# Patient Record
Sex: FEMALE | Race: WHITE | NOT HISPANIC OR LATINO | Employment: UNEMPLOYED | URBAN - METROPOLITAN AREA
[De-identification: names, ages, dates, MRNs, and addresses within clinical notes are randomized per-mention and may not be internally consistent; named-entity substitution may affect disease eponyms.]

---

## 2023-04-21 DIAGNOSIS — T75.3XXA MOTION SICKNESS, INITIAL ENCOUNTER: ICD-10-CM

## 2023-04-21 RX ORDER — CIPROFLOXACIN 500 MG/1
500 TABLET ORAL 2 TIMES DAILY
Qty: 20 TABLET | Refills: 0 | Status: SHIPPED | OUTPATIENT
Start: 2023-04-21 | End: 2023-05-01

## 2023-06-20 LAB — THYROTROPIN (MIU/L) IN SER/PLAS BY DETECTION LIMIT <= 0.05 MIU/L: 0.27 MIU/L (ref 0.44–3.98)

## 2023-08-21 LAB — THYROTROPIN (MIU/L) IN SER/PLAS BY DETECTION LIMIT <= 0.05 MIU/L: 6.38 MIU/L (ref 0.44–3.98)

## 2023-08-24 LAB
THYROGLOBULIN AB (IU/ML) IN SER/PLAS: <0.9 IU/ML (ref 0–4)
THYROGLOBULIN LC-MS/MS: ABNORMAL NG/ML (ref 1.3–31.8)
THYROGLOBULIN: <0.1 NG/ML (ref 1.3–31.8)

## 2023-10-25 ENCOUNTER — APPOINTMENT (OUTPATIENT)
Dept: RADIOLOGY | Facility: CLINIC | Age: 58
End: 2023-10-25
Payer: COMMERCIAL

## 2023-10-30 PROBLEM — L30.1 DYSHIDROTIC ECZEMA: Status: ACTIVE | Noted: 2023-10-30

## 2023-10-30 PROBLEM — M41.9 SCOLIOSIS OF THORACOLUMBAR SPINE: Status: ACTIVE | Noted: 2023-10-30

## 2023-10-30 PROBLEM — N89.8 VAGINAL DRYNESS: Status: ACTIVE | Noted: 2023-10-30

## 2023-10-30 PROBLEM — M81.0 OSTEOPOROSIS: Status: ACTIVE | Noted: 2023-10-30

## 2023-10-30 PROBLEM — B35.4 TINEA CORPORIS: Status: ACTIVE | Noted: 2023-10-30

## 2023-10-30 PROBLEM — C73 PAPILLARY CARCINOMA OF THYROID (MULTI): Status: ACTIVE | Noted: 2023-10-30

## 2023-10-30 PROBLEM — K58.0 IRRITABLE BOWEL SYNDROME WITH DIARRHEA: Status: ACTIVE | Noted: 2023-10-30

## 2023-10-30 PROBLEM — M25.50 JOINT PAIN: Status: ACTIVE | Noted: 2023-10-30

## 2023-10-30 PROBLEM — F41.9 ANXIETY: Status: ACTIVE | Noted: 2023-10-30

## 2023-10-30 PROBLEM — R53.83 FATIGUE: Status: ACTIVE | Noted: 2023-10-30

## 2023-10-30 PROBLEM — H00.019 STYE: Status: ACTIVE | Noted: 2023-10-30

## 2023-10-30 PROBLEM — E83.51 HYPOCALCEMIA: Status: ACTIVE | Noted: 2023-10-30

## 2023-10-30 PROBLEM — D23.4 BENIGN NEOPLASM OF SKIN OF NECK: Status: ACTIVE | Noted: 2023-10-30

## 2023-10-30 PROBLEM — R00.2 PALPITATIONS: Status: ACTIVE | Noted: 2023-10-30

## 2023-10-30 PROBLEM — R29.898 NECK TIGHTNESS: Status: ACTIVE | Noted: 2023-10-30

## 2023-10-30 PROBLEM — R10.13 EPIGASTRIC PAIN: Status: ACTIVE | Noted: 2023-10-30

## 2023-10-30 PROBLEM — K30 FUNCTIONAL DYSPEPSIA: Status: ACTIVE | Noted: 2023-10-30

## 2023-10-30 PROBLEM — L25.9 CONTACT DERMATITIS: Status: ACTIVE | Noted: 2023-10-30

## 2023-10-30 PROBLEM — E55.9 VITAMIN D DEFICIENCY: Status: ACTIVE | Noted: 2023-10-30

## 2023-10-30 PROBLEM — K64.5 THROMBOSED EXTERNAL HEMORRHOID: Status: ACTIVE | Noted: 2023-10-30

## 2023-10-30 PROBLEM — R10.2 PELVIC PAIN: Status: ACTIVE | Noted: 2023-10-30

## 2023-10-30 PROBLEM — E03.9 HYPOTHYROIDISM: Status: ACTIVE | Noted: 2023-10-30

## 2023-10-30 PROBLEM — N64.4 MASTALGIA: Status: ACTIVE | Noted: 2023-10-30

## 2023-10-30 PROBLEM — G47.00 INSOMNIA: Status: ACTIVE | Noted: 2023-10-30

## 2023-10-30 PROBLEM — M62.838 TRAPEZIUS MUSCLE SPASM: Status: ACTIVE | Noted: 2023-10-30

## 2023-10-30 PROBLEM — R59.1 LYMPHADENOPATHY: Status: ACTIVE | Noted: 2023-10-30

## 2023-10-30 PROBLEM — T75.3XXA TRAVEL SICKNESS: Status: ACTIVE | Noted: 2023-10-30

## 2023-10-30 PROBLEM — G44.209 TENSION HEADACHE: Status: ACTIVE | Noted: 2023-10-30

## 2023-10-30 PROBLEM — L56.3 SOLAR URTICARIA: Status: ACTIVE | Noted: 2023-10-30

## 2023-10-30 PROBLEM — K21.9 GERD (GASTROESOPHAGEAL REFLUX DISEASE): Status: ACTIVE | Noted: 2023-10-30

## 2023-10-30 PROBLEM — M85.80 OSTEOPENIA: Status: ACTIVE | Noted: 2023-10-30

## 2023-10-30 PROBLEM — L30.9 ECZEMA: Status: ACTIVE | Noted: 2023-10-30

## 2023-10-30 PROBLEM — M54.50 LOW BACK PAIN: Status: ACTIVE | Noted: 2023-10-30

## 2023-10-30 PROBLEM — N89.8 VAGINAL ITCHING: Status: ACTIVE | Noted: 2023-10-30

## 2023-10-30 RX ORDER — LEVOTHYROXINE SODIUM 75 UG/1
75 TABLET ORAL EVERY OTHER DAY
COMMUNITY
Start: 2016-08-24 | End: 2023-10-31 | Stop reason: SDUPTHER

## 2023-10-30 RX ORDER — ALPRAZOLAM 0.25 MG/1
.5-1 TABLET ORAL DAILY PRN
COMMUNITY

## 2023-10-30 RX ORDER — LEVOTHYROXINE SODIUM 88 UG/1
88 TABLET ORAL
COMMUNITY
Start: 2016-08-24 | End: 2023-10-31 | Stop reason: SDUPTHER

## 2023-10-30 RX ORDER — NEOMYCIN/POLYMYXIN B/PRAMOXINE 3.5-10K-1
1 CREAM (GRAM) TOPICAL DAILY
COMMUNITY
Start: 2021-07-01 | End: 2023-10-31 | Stop reason: WASHOUT

## 2023-10-30 RX ORDER — LEVOTHYROXINE SODIUM 100 UG/1
100 TABLET ORAL EVERY OTHER DAY
COMMUNITY
End: 2023-10-31 | Stop reason: WASHOUT

## 2023-10-30 RX ORDER — IBUPROFEN 800 MG/1
1 TABLET ORAL 3 TIMES DAILY PRN
COMMUNITY
Start: 2022-04-20 | End: 2023-10-31 | Stop reason: WASHOUT

## 2023-10-30 RX ORDER — CYCLOBENZAPRINE HCL 5 MG
1 TABLET ORAL NIGHTLY PRN
COMMUNITY
Start: 2022-04-20

## 2023-10-30 RX ORDER — CHOLECALCIFEROL (VITAMIN D3) 25 MCG
TABLET,CHEWABLE ORAL
COMMUNITY
Start: 2022-11-15

## 2023-10-30 RX ORDER — IBANDRONATE SODIUM 150 MG/1
150 TABLET, FILM COATED ORAL
COMMUNITY
Start: 2023-01-23 | End: 2023-10-31 | Stop reason: WASHOUT

## 2023-10-30 RX ORDER — MULTIVIT-MIN/IRON/FOLIC ACID/K 18-600-40
CAPSULE ORAL
COMMUNITY
Start: 2023-08-29 | End: 2023-10-31 | Stop reason: WASHOUT

## 2023-10-31 ENCOUNTER — OFFICE VISIT (OUTPATIENT)
Dept: ENDOCRINOLOGY | Facility: CLINIC | Age: 58
End: 2023-10-31
Payer: COMMERCIAL

## 2023-10-31 VITALS
BODY MASS INDEX: 19.8 KG/M2 | WEIGHT: 138 LBS | DIASTOLIC BLOOD PRESSURE: 64 MMHG | SYSTOLIC BLOOD PRESSURE: 105 MMHG | HEART RATE: 82 BPM

## 2023-10-31 DIAGNOSIS — C73 PAPILLARY CARCINOMA OF THYROID (MULTI): Primary | ICD-10-CM

## 2023-10-31 PROCEDURE — 99213 OFFICE O/P EST LOW 20 MIN: CPT | Performed by: INTERNAL MEDICINE

## 2023-10-31 RX ORDER — LEVOTHYROXINE SODIUM 75 UG/1
75 TABLET ORAL EVERY OTHER DAY
Qty: 90 TABLET | Refills: 3 | Status: SHIPPED | OUTPATIENT
Start: 2023-10-31 | End: 2024-02-06 | Stop reason: SDUPTHER

## 2023-10-31 RX ORDER — LEVOTHYROXINE SODIUM 88 UG/1
88 TABLET ORAL EVERY OTHER DAY
Qty: 90 TABLET | Refills: 3 | Status: SHIPPED | OUTPATIENT
Start: 2023-10-31 | End: 2024-02-06 | Stop reason: SDUPTHER
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
## 2023-10-31 NOTE — PATIENT INSTRUCTIONS
RECOMMENDATIONS  Continue Synthroid alternate 88 vs 75 mcg every day.   Take levothyroxine on an empty stomach with water alone, 1 hour before eating or taking other medications, 4 hours before any calcium or iron supplement.    Follow up 3 months  Repeat TSH before next appointment

## 2023-11-14 ENCOUNTER — ANCILLARY PROCEDURE (OUTPATIENT)
Dept: RADIOLOGY | Facility: CLINIC | Age: 58
End: 2023-11-14
Payer: COMMERCIAL

## 2023-11-14 DIAGNOSIS — R10.11 RIGHT UPPER QUADRANT PAIN: ICD-10-CM

## 2023-11-14 PROCEDURE — 3430000001 HC RX 343 DIAGNOSTIC RADIOPHARMACEUTICALS: Performed by: FAMILY MEDICINE

## 2023-11-14 PROCEDURE — A9537 TC99M MEBROFENIN: HCPCS | Performed by: FAMILY MEDICINE

## 2023-11-14 PROCEDURE — 78227 HEPATOBIL SYST IMAGE W/DRUG: CPT | Performed by: NUCLEAR MEDICINE

## 2023-11-14 PROCEDURE — 78227 HEPATOBIL SYST IMAGE W/DRUG: CPT

## 2023-11-14 RX ORDER — KIT FOR THE PREPARATION OF TECHNETIUM TC 99M MEBROFENIN 45 MG/10ML
5.36 INJECTION, POWDER, LYOPHILIZED, FOR SOLUTION INTRAVENOUS
Status: COMPLETED | OUTPATIENT
Start: 2023-11-14 | End: 2023-11-14

## 2023-11-14 RX ADMIN — KIT FOR THE PREPARATION OF TECHNETIUM TC 99M MEBROFENIN 5.36 MILLICURIE: 45 INJECTION, POWDER, LYOPHILIZED, FOR SOLUTION INTRAVENOUS at 08:56

## 2023-11-30 ENCOUNTER — LAB (OUTPATIENT)
Dept: LAB | Facility: LAB | Age: 58
End: 2023-11-30
Payer: COMMERCIAL

## 2023-11-30 DIAGNOSIS — R77.2 ABNORMALITY OF ALPHAFETOPROTEIN: ICD-10-CM

## 2023-11-30 DIAGNOSIS — R93.2 ABNORMAL FINDINGS ON DIAGNOSTIC IMAGING OF LIVER AND BILIARY TRACT: ICD-10-CM

## 2023-11-30 DIAGNOSIS — R19.5 OTHER FECAL ABNORMALITIES: ICD-10-CM

## 2023-11-30 DIAGNOSIS — E83.52 HYPERCALCEMIA: Primary | ICD-10-CM

## 2023-11-30 LAB
25(OH)D3 SERPL-MCNC: 74 NG/ML (ref 30–100)
ALBUMIN SERPL BCP-MCNC: 5 G/DL (ref 3.4–5)
ALP SERPL-CCNC: 42 U/L (ref 33–110)
ALT SERPL W P-5'-P-CCNC: 12 U/L (ref 7–45)
AMYLASE SERPL-CCNC: 75 U/L (ref 29–103)
ANION GAP SERPL CALC-SCNC: 15 MMOL/L (ref 10–20)
AST SERPL W P-5'-P-CCNC: 16 U/L (ref 9–39)
BILIRUB SERPL-MCNC: 0.7 MG/DL (ref 0–1.2)
BUN SERPL-MCNC: 9 MG/DL (ref 6–23)
CA-I BLD-SCNC: 1.12 MMOL/L (ref 1.1–1.33)
CALCIUM SERPL-MCNC: 9.9 MG/DL (ref 8.6–10.6)
CERULOPLASMIN SERPL-MCNC: 27.5 MG/DL (ref 20–60)
CHLORIDE SERPL-SCNC: 104 MMOL/L (ref 98–107)
CO2 SERPL-SCNC: 28 MMOL/L (ref 21–32)
CREAT SERPL-MCNC: 0.78 MG/DL (ref 0.5–1.05)
CRP SERPL-MCNC: <0.1 MG/DL
GFR SERPL CREATININE-BSD FRML MDRD: 89 ML/MIN/1.73M*2
GLUCOSE SERPL-MCNC: 85 MG/DL (ref 74–99)
LIPASE SERPL-CCNC: 24 U/L (ref 9–82)
POTASSIUM SERPL-SCNC: 4.3 MMOL/L (ref 3.5–5.3)
PROT SERPL-MCNC: 6.7 G/DL (ref 6.4–8.2)
PTH-INTACT SERPL-MCNC: 34.4 PG/ML (ref 18.5–88)
SODIUM SERPL-SCNC: 143 MMOL/L (ref 136–145)

## 2023-11-30 PROCEDURE — 83690 ASSAY OF LIPASE: CPT

## 2023-11-30 PROCEDURE — 80053 COMPREHEN METABOLIC PANEL: CPT

## 2023-11-30 PROCEDURE — 82306 VITAMIN D 25 HYDROXY: CPT

## 2023-11-30 PROCEDURE — 36415 COLL VENOUS BLD VENIPUNCTURE: CPT

## 2023-11-30 PROCEDURE — 84630 ASSAY OF ZINC: CPT

## 2023-11-30 PROCEDURE — 83625 ASSAY OF LDH ENZYMES: CPT

## 2023-11-30 PROCEDURE — 82330 ASSAY OF CALCIUM: CPT

## 2023-11-30 PROCEDURE — 86038 ANTINUCLEAR ANTIBODIES: CPT

## 2023-11-30 PROCEDURE — 83970 ASSAY OF PARATHORMONE: CPT

## 2023-11-30 PROCEDURE — 82525 ASSAY OF COPPER: CPT

## 2023-11-30 PROCEDURE — 86140 C-REACTIVE PROTEIN: CPT

## 2023-11-30 PROCEDURE — 82150 ASSAY OF AMYLASE: CPT

## 2023-11-30 PROCEDURE — 82390 ASSAY OF CERULOPLASMIN: CPT

## 2023-12-02 LAB
COPPER SERPL-MCNC: 119.6 UG/DL (ref 80–155)
ZINC SERPL-MCNC: 105.2 UG/DL (ref 60–120)

## 2023-12-03 LAB
ANA SER QL HEP2 SUBST: NEGATIVE
LDH SERPL L TO P-CCNC: 160 U/L (ref 105–230)
LDH1 CFR SERPL ELPH: 29 % (ref 14–27)
LDH2 CFR SERPL ELPH: 35 % (ref 29–42)
LDH3 CFR SERPL ELPH: 22 % (ref 18–30)
LDH4 CFR SERPL ELPH: 8 % (ref 8–15)
LDH5 CFR SERPL ELPH: 6 % (ref 6–23)

## 2023-12-06 ENCOUNTER — OFFICE VISIT (OUTPATIENT)
Dept: SURGERY | Facility: CLINIC | Age: 58
End: 2023-12-06
Payer: COMMERCIAL

## 2023-12-06 VITALS
SYSTOLIC BLOOD PRESSURE: 116 MMHG | HEART RATE: 61 BPM | DIASTOLIC BLOOD PRESSURE: 77 MMHG | WEIGHT: 132 LBS | OXYGEN SATURATION: 99 % | HEIGHT: 70 IN | BODY MASS INDEX: 18.9 KG/M2

## 2023-12-06 DIAGNOSIS — Z01.818 PRE-OPERATIVE EXAM: Primary | ICD-10-CM

## 2023-12-06 PROCEDURE — 99205 OFFICE O/P NEW HI 60 MIN: CPT | Performed by: SURGERY
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
## 2023-12-07 DIAGNOSIS — K80.20 CALCULUS OF GALLBLADDER WITHOUT CHOLECYSTITIS WITHOUT OBSTRUCTION: ICD-10-CM

## 2023-12-07 DIAGNOSIS — R16.0 ENLARGEMENT OF LIVER: ICD-10-CM

## 2023-12-08 ENCOUNTER — HOSPITAL ENCOUNTER (OUTPATIENT)
Dept: CARDIOLOGY | Facility: CLINIC | Age: 58
Discharge: HOME | End: 2023-12-08
Payer: COMMERCIAL

## 2023-12-08 DIAGNOSIS — Z01.818 PRE-OPERATIVE EXAM: ICD-10-CM

## 2023-12-08 LAB
ATRIAL RATE: 57 BPM
P AXIS: 29 DEGREES
P OFFSET: 207 MS
P ONSET: 165 MS
PR INTERVAL: 112 MS
Q ONSET: 221 MS
QRS COUNT: 9 BEATS
QRS DURATION: 90 MS
QT INTERVAL: 416 MS
QTC CALCULATION(BAZETT): 404 MS
QTC FREDERICIA: 409 MS
R AXIS: 92 DEGREES
T AXIS: 79 DEGREES
T OFFSET: 429 MS
VENTRICULAR RATE: 57 BPM

## 2023-12-08 PROCEDURE — 93010 ELECTROCARDIOGRAM REPORT: CPT | Performed by: INTERNAL MEDICINE

## 2023-12-08 PROCEDURE — 93005 ELECTROCARDIOGRAM TRACING: CPT

## 2023-12-19 ENCOUNTER — ANCILLARY PROCEDURE (OUTPATIENT)
Dept: RADIOLOGY | Facility: CLINIC | Age: 58
End: 2023-12-19
Payer: COMMERCIAL

## 2023-12-19 DIAGNOSIS — Z13.6 ENCOUNTER FOR SCREENING FOR CARDIOVASCULAR DISORDERS: ICD-10-CM

## 2023-12-19 PROCEDURE — 75571 CT HRT W/O DYE W/CA TEST: CPT

## 2023-12-21 ENCOUNTER — ANESTHESIA EVENT (OUTPATIENT)
Dept: OPERATING ROOM | Facility: HOSPITAL | Age: 58
End: 2023-12-21
Payer: COMMERCIAL

## 2023-12-28 ENCOUNTER — APPOINTMENT (OUTPATIENT)
Dept: GASTROENTEROLOGY | Facility: CLINIC | Age: 58
End: 2023-12-28
Payer: COMMERCIAL

## 2023-12-28 ENCOUNTER — HOSPITAL ENCOUNTER (OUTPATIENT)
Facility: HOSPITAL | Age: 58
Setting detail: OUTPATIENT SURGERY
Discharge: HOME | End: 2023-12-28
Attending: SURGERY | Admitting: SURGERY
Payer: COMMERCIAL

## 2023-12-28 ENCOUNTER — ANESTHESIA (OUTPATIENT)
Dept: OPERATING ROOM | Facility: HOSPITAL | Age: 58
End: 2023-12-28
Payer: COMMERCIAL

## 2023-12-28 VITALS
HEIGHT: 70 IN | SYSTOLIC BLOOD PRESSURE: 128 MMHG | OXYGEN SATURATION: 100 % | BODY MASS INDEX: 18.94 KG/M2 | RESPIRATION RATE: 19 BRPM | HEART RATE: 60 BPM | TEMPERATURE: 97.9 F | WEIGHT: 132.28 LBS | DIASTOLIC BLOOD PRESSURE: 68 MMHG

## 2023-12-28 DIAGNOSIS — R16.0 ENLARGEMENT OF LIVER: ICD-10-CM

## 2023-12-28 DIAGNOSIS — K80.20 CALCULUS OF GALLBLADDER WITHOUT CHOLECYSTITIS WITHOUT OBSTRUCTION: Primary | ICD-10-CM

## 2023-12-28 PROBLEM — R11.2 PONV (POSTOPERATIVE NAUSEA AND VOMITING): Status: ACTIVE | Noted: 2023-12-28

## 2023-12-28 PROBLEM — Z98.890 PONV (POSTOPERATIVE NAUSEA AND VOMITING): Status: ACTIVE | Noted: 2023-12-28

## 2023-12-28 PROCEDURE — 3700000002 HC GENERAL ANESTHESIA TIME - EACH INCREMENTAL 1 MINUTE: Performed by: SURGERY

## 2023-12-28 PROCEDURE — 2500000004 HC RX 250 GENERAL PHARMACY W/ HCPCS (ALT 636 FOR OP/ED): Performed by: ANESTHESIOLOGY

## 2023-12-28 PROCEDURE — 2500000001 HC RX 250 WO HCPCS SELF ADMINISTERED DRUGS (ALT 637 FOR MEDICARE OP): Performed by: SURGERY

## 2023-12-28 PROCEDURE — 7100000010 HC PHASE TWO TIME - EACH INCREMENTAL 1 MINUTE: Performed by: SURGERY

## 2023-12-28 PROCEDURE — 49321 LAPAROSCOPY BIOPSY: CPT | Performed by: SURGERY

## 2023-12-28 PROCEDURE — 3700000001 HC GENERAL ANESTHESIA TIME - INITIAL BASE CHARGE: Performed by: SURGERY

## 2023-12-28 PROCEDURE — 88307 TISSUE EXAM BY PATHOLOGIST: CPT | Mod: TC,SUR,GEALAB | Performed by: SURGERY

## 2023-12-28 PROCEDURE — 2500000004 HC RX 250 GENERAL PHARMACY W/ HCPCS (ALT 636 FOR OP/ED): Performed by: NURSE ANESTHETIST, CERTIFIED REGISTERED

## 2023-12-28 PROCEDURE — 7100000009 HC PHASE TWO TIME - INITIAL BASE CHARGE: Performed by: SURGERY

## 2023-12-28 PROCEDURE — 7100000001 HC RECOVERY ROOM TIME - INITIAL BASE CHARGE: Performed by: SURGERY

## 2023-12-28 PROCEDURE — 3600000004 HC OR TIME - INITIAL BASE CHARGE - PROCEDURE LEVEL FOUR: Performed by: SURGERY

## 2023-12-28 PROCEDURE — 47562 LAPAROSCOPIC CHOLECYSTECTOMY: CPT | Performed by: SURGERY

## 2023-12-28 PROCEDURE — 7100000002 HC RECOVERY ROOM TIME - EACH INCREMENTAL 1 MINUTE: Performed by: SURGERY

## 2023-12-28 PROCEDURE — 96372 THER/PROPH/DIAG INJ SC/IM: CPT | Performed by: SURGERY

## 2023-12-28 PROCEDURE — 3600000009 HC OR TIME - EACH INCREMENTAL 1 MINUTE - PROCEDURE LEVEL FOUR: Performed by: SURGERY

## 2023-12-28 PROCEDURE — 2720000007 HC OR 272 NO HCPCS: Performed by: SURGERY

## 2023-12-28 PROCEDURE — 88307 TISSUE EXAM BY PATHOLOGIST: CPT

## 2023-12-28 PROCEDURE — A47562 PR LAP,CHOLECYSTECTOMY: Performed by: NURSE ANESTHETIST, CERTIFIED REGISTERED

## 2023-12-28 PROCEDURE — 88313 SPECIAL STAINS GROUP 2: CPT

## 2023-12-28 PROCEDURE — 2780000003 HC OR 278 NO HCPCS: Performed by: SURGERY

## 2023-12-28 PROCEDURE — 2500000005 HC RX 250 GENERAL PHARMACY W/O HCPCS: Performed by: SURGERY

## 2023-12-28 PROCEDURE — 2500000005 HC RX 250 GENERAL PHARMACY W/O HCPCS: Performed by: NURSE ANESTHETIST, CERTIFIED REGISTERED

## 2023-12-28 PROCEDURE — 88304 TISSUE EXAM BY PATHOLOGIST: CPT

## 2023-12-28 PROCEDURE — 2500000004 HC RX 250 GENERAL PHARMACY W/ HCPCS (ALT 636 FOR OP/ED): Performed by: SURGERY

## 2023-12-28 RX ORDER — ONDANSETRON HYDROCHLORIDE 2 MG/ML
INJECTION, SOLUTION INTRAVENOUS AS NEEDED
Status: DISCONTINUED | OUTPATIENT
Start: 2023-12-28 | End: 2023-12-28

## 2023-12-28 RX ORDER — ENOXAPARIN SODIUM 100 MG/ML
30 INJECTION SUBCUTANEOUS ONCE
Status: COMPLETED | OUTPATIENT
Start: 2023-12-28 | End: 2023-12-28

## 2023-12-28 RX ORDER — ACETAMINOPHEN 325 MG/1
650 TABLET ORAL ONCE
Status: COMPLETED | OUTPATIENT
Start: 2023-12-28 | End: 2023-12-28

## 2023-12-28 RX ORDER — DEXAMETHASONE SODIUM PHOSPHATE 4 MG/ML
INJECTION, SOLUTION INTRA-ARTICULAR; INTRALESIONAL; INTRAMUSCULAR; INTRAVENOUS; SOFT TISSUE AS NEEDED
Status: DISCONTINUED | OUTPATIENT
Start: 2023-12-28 | End: 2023-12-28

## 2023-12-28 RX ORDER — KETOROLAC TROMETHAMINE 30 MG/ML
INJECTION, SOLUTION INTRAMUSCULAR; INTRAVENOUS AS NEEDED
Status: DISCONTINUED | OUTPATIENT
Start: 2023-12-28 | End: 2023-12-28

## 2023-12-28 RX ORDER — CEFAZOLIN 1 G/1
INJECTION, POWDER, FOR SOLUTION INTRAVENOUS AS NEEDED
Status: DISCONTINUED | OUTPATIENT
Start: 2023-12-28 | End: 2023-12-28

## 2023-12-28 RX ORDER — SODIUM CHLORIDE, SODIUM LACTATE, POTASSIUM CHLORIDE, CALCIUM CHLORIDE 600; 310; 30; 20 MG/100ML; MG/100ML; MG/100ML; MG/100ML
100 INJECTION, SOLUTION INTRAVENOUS CONTINUOUS
Status: DISCONTINUED | OUTPATIENT
Start: 2023-12-28 | End: 2023-12-28 | Stop reason: HOSPADM

## 2023-12-28 RX ORDER — GABAPENTIN 300 MG/1
300 CAPSULE ORAL ONCE
Status: COMPLETED | OUTPATIENT
Start: 2023-12-28 | End: 2023-12-28

## 2023-12-28 RX ORDER — MIDAZOLAM HYDROCHLORIDE 1 MG/ML
INJECTION, SOLUTION INTRAMUSCULAR; INTRAVENOUS AS NEEDED
Status: DISCONTINUED | OUTPATIENT
Start: 2023-12-28 | End: 2023-12-28

## 2023-12-28 RX ORDER — IBUPROFEN 600 MG/1
600 TABLET ORAL EVERY 6 HOURS
Qty: 10 TABLET | Refills: 0 | Status: SHIPPED | OUTPATIENT
Start: 2023-12-28 | End: 2023-12-31

## 2023-12-28 RX ORDER — DROPERIDOL 2.5 MG/ML
0.62 INJECTION, SOLUTION INTRAMUSCULAR; INTRAVENOUS ONCE AS NEEDED
Status: DISCONTINUED | OUTPATIENT
Start: 2023-12-28 | End: 2023-12-28 | Stop reason: HOSPADM

## 2023-12-28 RX ORDER — OXYCODONE HYDROCHLORIDE 5 MG/1
5 TABLET ORAL EVERY 6 HOURS PRN
Qty: 5 TABLET | Refills: 0 | Status: SHIPPED | OUTPATIENT
Start: 2023-12-28 | End: 2024-02-06 | Stop reason: WASHOUT

## 2023-12-28 RX ORDER — ROCURONIUM BROMIDE 10 MG/ML
INJECTION, SOLUTION INTRAVENOUS AS NEEDED
Status: DISCONTINUED | OUTPATIENT
Start: 2023-12-28 | End: 2023-12-28

## 2023-12-28 RX ORDER — OXYCODONE HYDROCHLORIDE 5 MG/1
5 TABLET ORAL EVERY 4 HOURS PRN
Status: DISCONTINUED | OUTPATIENT
Start: 2023-12-28 | End: 2023-12-28 | Stop reason: HOSPADM

## 2023-12-28 RX ORDER — FENTANYL CITRATE 50 UG/ML
INJECTION, SOLUTION INTRAMUSCULAR; INTRAVENOUS AS NEEDED
Status: DISCONTINUED | OUTPATIENT
Start: 2023-12-28 | End: 2023-12-28

## 2023-12-28 RX ORDER — LIDOCAINE HYDROCHLORIDE 20 MG/ML
INJECTION, SOLUTION INFILTRATION; PERINEURAL AS NEEDED
Status: DISCONTINUED | OUTPATIENT
Start: 2023-12-28 | End: 2023-12-28

## 2023-12-28 RX ORDER — POLYETHYLENE GLYCOL 3350 17 G/17G
17 POWDER, FOR SOLUTION ORAL DAILY
Qty: 170 G | Refills: 0 | Status: SHIPPED | OUTPATIENT
Start: 2023-12-28 | End: 2024-01-07

## 2023-12-28 RX ORDER — ONDANSETRON HYDROCHLORIDE 2 MG/ML
4 INJECTION, SOLUTION INTRAVENOUS ONCE AS NEEDED
Status: COMPLETED | OUTPATIENT
Start: 2023-12-28 | End: 2023-12-28

## 2023-12-28 RX ORDER — INDOCYANINE GREEN AND WATER 25 MG
2.5 KIT INJECTION ONCE
Status: COMPLETED | OUTPATIENT
Start: 2023-12-28 | End: 2023-12-28

## 2023-12-28 RX ORDER — PROPOFOL 10 MG/ML
INJECTION, EMULSION INTRAVENOUS AS NEEDED
Status: DISCONTINUED | OUTPATIENT
Start: 2023-12-28 | End: 2023-12-28

## 2023-12-28 RX ORDER — PHENYLEPHRINE HCL IN 0.9% NACL 0.4MG/10ML
SYRINGE (ML) INTRAVENOUS AS NEEDED
Status: DISCONTINUED | OUTPATIENT
Start: 2023-12-28 | End: 2023-12-28

## 2023-12-28 RX ORDER — ACETAMINOPHEN 325 MG/1
650 TABLET ORAL EVERY 6 HOURS
Qty: 20 TABLET | Refills: 0 | Status: SHIPPED | OUTPATIENT
Start: 2023-12-28 | End: 2023-12-31

## 2023-12-28 RX ORDER — ONDANSETRON 4 MG/1
4 TABLET, FILM COATED ORAL EVERY 6 HOURS PRN
Qty: 20 TABLET | Refills: 0 | Status: SHIPPED | OUTPATIENT
Start: 2023-12-28 | End: 2024-02-06 | Stop reason: WASHOUT

## 2023-12-28 RX ORDER — BUPIVACAINE HYDROCHLORIDE 5 MG/ML
INJECTION, SOLUTION EPIDURAL; INTRACAUDAL AS NEEDED
Status: DISCONTINUED | OUTPATIENT
Start: 2023-12-28 | End: 2023-12-28 | Stop reason: HOSPADM

## 2023-12-28 RX ADMIN — INDOCYANINE GREEN AND WATER 2.5 MG: KIT at 10:07

## 2023-12-28 RX ADMIN — ONDANSETRON 4 MG: 2 INJECTION INTRAMUSCULAR; INTRAVENOUS at 13:12

## 2023-12-28 RX ADMIN — KETOROLAC TROMETHAMINE 30 MG: 30 INJECTION, SOLUTION INTRAMUSCULAR at 12:37

## 2023-12-28 RX ADMIN — PROPOFOL 100 MG: 10 INJECTION, EMULSION INTRAVENOUS at 11:46

## 2023-12-28 RX ADMIN — HYDROMORPHONE HYDROCHLORIDE 0.5 MG: 1 INJECTION, SOLUTION INTRAMUSCULAR; INTRAVENOUS; SUBCUTANEOUS at 13:17

## 2023-12-28 RX ADMIN — GABAPENTIN 300 MG: 300 CAPSULE ORAL at 10:06

## 2023-12-28 RX ADMIN — CEFAZOLIN 2 G: 1 INJECTION, POWDER, FOR SOLUTION INTRAMUSCULAR; INTRAVENOUS at 11:56

## 2023-12-28 RX ADMIN — LIDOCAINE HYDROCHLORIDE 50 MG: 20 INJECTION, SOLUTION INFILTRATION; PERINEURAL at 11:46

## 2023-12-28 RX ADMIN — FENTANYL CITRATE 50 MCG: 50 INJECTION, SOLUTION INTRAMUSCULAR; INTRAVENOUS at 11:36

## 2023-12-28 RX ADMIN — ACETAMINOPHEN 650 MG: 325 TABLET ORAL at 10:06

## 2023-12-28 RX ADMIN — ONDANSETRON 4 MG: 2 INJECTION INTRAMUSCULAR; INTRAVENOUS at 12:33

## 2023-12-28 RX ADMIN — SODIUM CHLORIDE, POTASSIUM CHLORIDE, SODIUM LACTATE AND CALCIUM CHLORIDE 100 ML/HR: 600; 310; 30; 20 INJECTION, SOLUTION INTRAVENOUS at 10:06

## 2023-12-28 RX ADMIN — MIDAZOLAM 2 MG: 1 INJECTION INTRAMUSCULAR; INTRAVENOUS at 11:37

## 2023-12-28 RX ADMIN — Medication 80 MCG: at 12:26

## 2023-12-28 RX ADMIN — ENOXAPARIN SODIUM 30 MG: 30 INJECTION SUBCUTANEOUS at 10:07

## 2023-12-28 RX ADMIN — FENTANYL CITRATE 50 MCG: 50 INJECTION, SOLUTION INTRAMUSCULAR; INTRAVENOUS at 11:42

## 2023-12-28 RX ADMIN — ROCURONIUM BROMIDE 10 MG: 10 INJECTION, SOLUTION INTRAVENOUS at 12:31

## 2023-12-28 RX ADMIN — Medication 80 MCG: at 11:57

## 2023-12-28 RX ADMIN — ROCURONIUM BROMIDE 50 MG: 10 INJECTION, SOLUTION INTRAVENOUS at 11:46

## 2023-12-28 RX ADMIN — SUGAMMADEX 100 MG: 100 INJECTION, SOLUTION INTRAVENOUS at 12:38

## 2023-12-28 RX ADMIN — DEXAMETHASONE SODIUM PHOSPHATE 4 MG: 4 INJECTION INTRA-ARTICULAR; INTRALESIONAL; INTRAMUSCULAR; INTRAVENOUS; SOFT TISSUE at 12:01

## 2023-12-28 SDOH — HEALTH STABILITY: MENTAL HEALTH: CURRENT SMOKER: 0

## 2023-12-28 ASSESSMENT — PAIN DESCRIPTION - LOCATION: LOCATION: ABDOMEN

## 2023-12-28 ASSESSMENT — PAIN SCALES - GENERAL
PAINLEVEL_OUTOF10: 1
PAINLEVEL_OUTOF10: 4
PAINLEVEL_OUTOF10: 5 - MODERATE PAIN
PAINLEVEL_OUTOF10: 7

## 2023-12-28 ASSESSMENT — PAIN - FUNCTIONAL ASSESSMENT
PAIN_FUNCTIONAL_ASSESSMENT: 0-10

## 2023-12-28 ASSESSMENT — COLUMBIA-SUICIDE SEVERITY RATING SCALE - C-SSRS
1. IN THE PAST MONTH, HAVE YOU WISHED YOU WERE DEAD OR WISHED YOU COULD GO TO SLEEP AND NOT WAKE UP?: NO
2. HAVE YOU ACTUALLY HAD ANY THOUGHTS OF KILLING YOURSELF?: NO
6. HAVE YOU EVER DONE ANYTHING, STARTED TO DO ANYTHING, OR PREPARED TO DO ANYTHING TO END YOUR LIFE?: NO

## 2023-12-28 NOTE — ANESTHESIA POSTPROCEDURE EVALUATION
Patient: Julia Barney    Procedure Summary       Date: 12/28/23 Room / Location: [facility] / [facility]    Anesthesia Start: 1140 Anesthesia Stop: 1259    Procedures:       LAPAROSCOPI CHOLECYSTECTOMY      LIVER BIOPSY Diagnosis:       Calculus of gallbladder without cholecystitis without obstruction      Enlargement of liver      (Calculus of gallbladder without cholecystitis without obstruction [K80.20])      (Enlargement of liver [R16.0])    Surgeons: Casey Cochran MD Responsible Provider: LAURY Beckford    Anesthesia Type: general ASA Status: 2            Anesthesia Type: general    Vitals Value Taken Time   /79 12/28/23 1302   Temp 36.6 °C (97.9 °F) 12/28/23 1255   Pulse 62 12/28/23 1325   Resp 16 12/28/23 1325   SpO2 100 % 12/28/23 1325       Anesthesia Post Evaluation    Patient location during evaluation: PACU  Patient participation: complete - patient participated  Level of consciousness: awake  Pain management: adequate  Multimodal analgesia pain management approach  Airway patency: patent  Two or more strategies used to mitigate risk of obstructive sleep apnea  Cardiovascular status: acceptable  Respiratory status: acceptable  Hydration status: acceptable  Postoperative Nausea and Vomiting: none        No notable events documented.

## 2023-12-28 NOTE — DISCHARGE INSTRUCTIONS
PATIENT INSTRUCTIONS  Cholecystectomy    FOLLOW-UP: Please call the office after being discharged to make a follow up appointment in 2-3 weeks. Call your physician immediately if you have any fevers greater than 103 degrees Fahrenheit, drainage from your wound that is not clear or looks infected, persistent bleeding, increasing abdominal pain,  or persistent nausea/vomiting.     WOUND CARE INSTRUCTIONS: Incisions are closed with absorbable sutures and covered with glue. Glue will fall off in about 2 weeks. If the wound becomes bright red and painful or starts to drain infected material that is not clear, please contact your physician immediately. If the wound is mildly pink and has a thick firm ridge underneath it, this is normal and is referred to as a healing ridge. This will resolve over the next 4-6 weeks. You are welcome to shower the day after surgery. Wash abdomen with warm, soapy water using your hand or gentle wash cloth. Pat dry. Do not submerge incisions in a bath tub or swimming pool for 2 weeks following surgery.    DRAIN: If your surgeon discharges you with a drain, you will need to empty it when it becomes 1/3 full. Empty it into a graduated specimen cup and record total outputs for the day. Bring these recordings to your follow up appointment. Strip your drain once a day to prevent clogging. Your drain will likely be removed in 1 week in office. The drain fluid should turn from a pink, watery fluid to a straw yellow, watery fluid. If it turns green, brown, dark red and thick or develops a bad odor, call our office immediately.     DIET: You may eat any foods that you can tolerate. We recommend following a bland, easily digestible diet for the first few days after surgery until your appetite improves. It is a good idea to eat a high fiber diet, and take in plenty of fluids to prevent constipation. Take Miralax daily if experiencing constipation after surgery until stools are a pudding like consistency  and easy to pass.     ACTIVITY: You are encouraged to cough and take deep breaths or use the incentive spirometry that was provided. This will help prevent respiratory complications and low grade fevers post-operatively. You may want to hug a pillow when coughing and sneezing to add additional support to the surgical area which will decrease pain during these times. You should not lift more than 10 pounds for 4 weeks after surgery as it could put you at increased risk for a post-operative hernia. We encourage frequent ambulation and getting out of bed as much as possible while you recover to improve your recovery process. Avoid strenuous activity for 4 weeks, which includes exercise that increases the heart rate, breathing rate, or makes you break a sweat.     MEDICATIONS: Plan to take Tylenol and Ibuprofen (if your physician approves) every 6 hours for the first week after surgery. Alternatively, you can alternate these two medications every three hours for the first week. You will be provided a short course of a narcotic medication to take for breakthrough pain as needed. You should not drive, make important decisions, or operate machinery when taking narcotic pain medication.    QUESTIONS: Please feel free to call your physician's office for any questions or concerns following surgery. Our office number is [phone].

## 2023-12-28 NOTE — ANESTHESIA PROCEDURE NOTES
Airway  Date/Time: 12/28/2023 11:51 AM  Urgency: elective      Staffing  Performed: CRNA   Authorized by: LAURY eBckford    Performed by: LAURY Beckford  Patient location during procedure: OR    Indications and Patient Condition  Indications for airway management: anesthesia  Spontaneous Ventilation: absent  Sedation level: deep  Preoxygenated: yes  Patient position: sniffing  Mask difficulty assessment: 2 - vent by mask + OA or adjuvant +/- NMBA    Final Airway Details  Final airway type: endotracheal airway      Successful airway: ETT  Cuffed: yes   Successful intubation technique: video laryngoscopy  Facilitating devices/methods: intubating stylet  Blade: Tian  Blade size: #4  ETT size (mm): 7.0  Cormack-Lehane Classification: grade I - full view of glottis  Placement verified by: chest auscultation and capnometry   Measured from: lips  ETT to lips (cm): 21  Number of attempts at approach: 1  Number of other approaches attempted: 0

## 2023-12-28 NOTE — ANESTHESIA PREPROCEDURE EVALUATION
Patient: Julia Barney    Procedure Information       Date/Time: 12/28/23 1130    Procedures:       LAPAROSCOPI CHOLECYSTECTOMY - laparoscopic cholecystectomy with a liver biopsy.      LIVER BIOPSY - laparoscopic cholecystectomy with a liver biopsy.    Location: [facility] / [facility]    Surgeons: Casey Cochran MD            Relevant Problems   Anesthesia   (+) PONV (postoperative nausea and vomiting)      Cardiovascular   (+) Mastalgia      Endocrine   (+) Hypothyroidism   (+) Papillary carcinoma of thyroid (CMS/HCC)      GI   (+) GERD (gastroesophageal reflux disease)   (+) Irritable bowel syndrome with diarrhea      /Renal   (+) Enlargement of liver      Neuro/Psych   (+) Anxiety      GI/Hepatic   (+) Calculus of gallbladder without cholecystitis without obstruction      Musculoskeletal   (+) Scoliosis of thoracolumbar spine      Infectious Disease   (+) Tinea corporis       Clinical information reviewed:    Allergies  Meds               NPO Detail:  NPO/Void Status  Date of Last Liquid: 12/28/23  Time of Last Liquid: 0700  Date of Last Solid: 12/27/23  Time of Last Solid: 2000         Physical Exam    Airway  Mallampati: II  TM distance: >3 FB  Neck ROM: full     Cardiovascular - normal exam     Dental    Pulmonary - normal exam     Abdominal - normal exam         Patient admits to PONV, given emend 40 mg    Anesthesia Plan    ASA 2     general     The patient is not a current smoker.    Anesthetic plan and risks discussed with patient.  Use of blood products discussed with patient who.    Plan discussed with CRNA.

## 2023-12-28 NOTE — OP NOTE
LAPAROSCOPI CHOLECYSTECTOMY, LIVER BIOPSY     Operative Date: 12/28/23  Patient's Name: Julia Barney  Patient's YOB: 1965  Patient's MRN: [id number]  Patient's Age: 58 y.o.  Operating Room Location: [facility]  Patient's ASA: II  Patient's Estimated Blood Loss: 5 mL        PREOPERATIVE DIAGNOSIS:   Chronic cholecystitis  Abnormal LFTs        POSTOPERATIVE DIAGNOSIS:   Chronic cholecystitis  Abnormal LFTs        OPERATION/PROCEDURE:   Laparoscopic cholecystectomy with firefly cholangiography  Liver wedge biopsy        SURGEON:   Casey Cochran MD.         ASSISTANT:   Scrub assist.           INDICATIONS:   This is a 58 y.o. female who presented with chronic cholecystitis.  We also discussed a liver biopsy due to abnormal LFTs and mild hepatomegaly.        OPERATION:   The reasons for, benefits to, and risks of surgery were discussed with the patient.  The risks included, but not limited to, bleeding, infection, persistent pain, injury to surrounding structures, and postoperative abscess.  The patient appeared to understand and consented for surgery.  She was therefore brought back to the operating room and placed on the operating room table in the supine position.  Her abdomen was prepped and draped in a standard fashion.       We accessed the abdomen in the left upper quadrant with a Veress needle.  We then insufflated the pressure.  After insufflating to pressure, we made a 5 mm incision periumbilically.  We then entered the abdomen with a 5 mm optical view port.  We then removed the Veress needle.  We then performed our bilateral transversus abdominis plane block, instilling 15 mL of half percent Marcaine into each transversus abdominis plane under direct visualization.  We then placed our three 5 mm working ports in the standard right subcostal location.  We then placed the patient in reverse Trendelenburg positioning with right side up.  We then grasped the dome  of the gallbladder and lifted it over the liver in the standard fashion.  We then inspected the infundibulum and opened the peritoneum over the infundibulum extending it medially and laterally to the reflective edges of the liver.  This gave us more mobility on the infundibulum.  We then began dissecting on the triangle of Calot.  We eventually elevated the infundibulum off of the cystic plate of the liver.  We had two tubular structures entering the infundibulum with nothing returning to the liver.  We had a window between these structures and could see nothing but liver on the other side.  We then activated our firefly camera.  She had previously received indocyanine green in the preoperative area.  We could then see the liver lighting up green, liquid trace the common bile duct lighting up drain from the liver down to the duodenum.  We could then see 1 tubular structure tracking from the common bile duct up to the infundibulum lighting up green.  The other tubular structure did not light up green.  We thus obtained our critical view of safety, identifying our cystic duct and cystic artery.  We then clipped across the artery with two clips down and one clip up.  We then clipped across the duct with three clips down and one clip up.  We then transected each structure between our clips.  We then dissected the gallbladder off of the gallbladder fossa in the standard bottom-up technique.  We then used an Endo Catch bag to extract the gallbladder through the epigastric 8 mm port site.      We then used our sharp scissors to cut a 1x2 cm wedge biopsy from the edge of the liver at about 4B.  We then cauterized the liver edge. We removed the liver wedge biopsy with an endocatch bag.  We then removed the remaining ports under direct visualization.  We desufflated the abdomen.  We closed the skin of all of our port sites with subcuticular 4-0 Vicryl suture.  Dermabond was applied over top.         DISPOSITION:   The patient  tolerated the procedure well.  There were no immediate complications.  She will be brought to the postanesthesia care unit. From there, she will be discharged home with outpatient followup with me.      I was present and scrubbed in for the entire procedure.      CPT Code:  89987  10484      Operating Room Staff:  CRNA: LAURY Beckford  Circulator: Mariah Fischer RN; JOSETTE Persaud MD  General Surgery  Office: [phone]  Fax:     [phone]  1:18 PM  12/28/23

## 2024-01-08 LAB
LABORATORY COMMENT REPORT: NORMAL
PATH REPORT.COMMENTS IMP SPEC: NORMAL
PATH REPORT.FINAL DX SPEC: NORMAL
PATH REPORT.GROSS SPEC: NORMAL
PATH REPORT.RELEVANT HX SPEC: NORMAL
PATH REPORT.TOTAL CANCER: NORMAL

## 2024-01-17 ENCOUNTER — OFFICE VISIT (OUTPATIENT)
Dept: SURGERY | Facility: CLINIC | Age: 59
End: 2024-01-17
Payer: COMMERCIAL

## 2024-01-17 VITALS
BODY MASS INDEX: 18.94 KG/M2 | DIASTOLIC BLOOD PRESSURE: 70 MMHG | OXYGEN SATURATION: 97 % | SYSTOLIC BLOOD PRESSURE: 108 MMHG | WEIGHT: 132 LBS | HEART RATE: 75 BPM

## 2024-01-17 DIAGNOSIS — K81.1 CHRONIC CHOLECYSTITIS: Primary | ICD-10-CM

## 2024-01-17 PROCEDURE — 99024 POSTOP FOLLOW-UP VISIT: CPT | Performed by: SURGERY
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
## 2024-01-30 ENCOUNTER — LAB (OUTPATIENT)
Dept: LAB | Facility: LAB | Age: 59
End: 2024-01-30
Payer: COMMERCIAL

## 2024-01-30 DIAGNOSIS — C73 MALIGNANT NEOPLASM OF THYROID GLAND (MULTI): Primary | ICD-10-CM

## 2024-01-30 PROCEDURE — 84443 ASSAY THYROID STIM HORMONE: CPT

## 2024-01-30 PROCEDURE — 36415 COLL VENOUS BLD VENIPUNCTURE: CPT

## 2024-01-31 LAB — TSH SERPL-ACNC: 4.1 MIU/L (ref 0.44–3.98)

## 2024-02-06 ENCOUNTER — OFFICE VISIT (OUTPATIENT)
Dept: ENDOCRINOLOGY | Facility: CLINIC | Age: 59
End: 2024-02-06
Payer: COMMERCIAL

## 2024-02-06 VITALS
HEART RATE: 75 BPM | DIASTOLIC BLOOD PRESSURE: 70 MMHG | WEIGHT: 133 LBS | BODY MASS INDEX: 19.08 KG/M2 | SYSTOLIC BLOOD PRESSURE: 108 MMHG

## 2024-02-06 DIAGNOSIS — C73 PAPILLARY CARCINOMA OF THYROID (MULTI): ICD-10-CM

## 2024-02-06 DIAGNOSIS — E89.0 POSTOPERATIVE HYPOTHYROIDISM: Primary | ICD-10-CM

## 2024-02-06 PROCEDURE — 99213 OFFICE O/P EST LOW 20 MIN: CPT | Performed by: INTERNAL MEDICINE

## 2024-02-06 RX ORDER — LEVOTHYROXINE SODIUM 75 UG/1
75 TABLET ORAL 2 TIMES WEEKLY
Qty: 26 TABLET | Refills: 3 | Status: SHIPPED | OUTPATIENT
Start: 2024-02-08 | End: 2025-02-07

## 2024-02-06 RX ORDER — LEVOTHYROXINE SODIUM 88 UG/1
88 TABLET ORAL
Qty: 64 TABLET | Refills: 3 | Status: SHIPPED | OUTPATIENT
Start: 2024-02-06 | End: 2025-02-05

## 2024-02-06 ASSESSMENT — ENCOUNTER SYMPTOMS: UNEXPECTED WEIGHT CHANGE: 0
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
## 2024-02-06 NOTE — PATIENT INSTRUCTIONS
RECOMMENDATIONS  Synthroid 75 mcg two days per week  Synthroid 88 mcg 5 days per week    Take levothyroxine on an empty stomach with water alone, 1 hour before eating or taking other medications, 4 hours before any calcium or iron supplement.    Follow up 3 months  Repeat TSH before next appointment

## 2024-02-15 ENCOUNTER — OFFICE VISIT (OUTPATIENT)
Dept: GASTROENTEROLOGY | Facility: CLINIC | Age: 59
End: 2024-02-15
Payer: COMMERCIAL

## 2024-02-15 VITALS
WEIGHT: 134 LBS | BODY MASS INDEX: 19.23 KG/M2 | DIASTOLIC BLOOD PRESSURE: 73 MMHG | OXYGEN SATURATION: 100 % | TEMPERATURE: 98.4 F | SYSTOLIC BLOOD PRESSURE: 104 MMHG | HEART RATE: 74 BPM

## 2024-02-15 DIAGNOSIS — Z98.890 HISTORY OF LIVER BIOPSY: ICD-10-CM

## 2024-02-15 DIAGNOSIS — R16.0 ENLARGED LIVER: ICD-10-CM

## 2024-02-15 DIAGNOSIS — Z14.8 CARRIER OF ALPHA-1-ANTITRYPSIN DEFICIENCY: Primary | ICD-10-CM

## 2024-02-15 PROCEDURE — 99203 OFFICE O/P NEW LOW 30 MIN: CPT | Performed by: INTERNAL MEDICINE

## 2024-02-15 PROCEDURE — 99213 OFFICE O/P EST LOW 20 MIN: CPT | Performed by: INTERNAL MEDICINE

## 2024-02-15 PROCEDURE — 1036F TOBACCO NON-USER: CPT | Performed by: INTERNAL MEDICINE

## 2024-02-15 ASSESSMENT — ENCOUNTER SYMPTOMS: DEPRESSION: 0

## 2024-02-15 ASSESSMENT — PAIN SCALES - GENERAL: PAINLEVEL: 0-NO PAIN
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
## 2024-02-15 NOTE — PATIENT INSTRUCTIONS
Welcome to Dr. Reggie Cra[facility].  Dr. Craven sees patients at the following sites:  [place] [address] [facility] at [facility]  [address], [address] at [facility] at [facility], [facility] [address]    Dr. Craven's hepatology care coordinator, Neyda FINCH, can be reached at [phone].  Dr. Craven's , Yulissa Lopez, can be reached at [phone].    We reviewed all of your available testing. There is no indication of liver issues.     You can follow back up with your PCP for care. If any future liver issues arise, please contact my office.
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
## 2024-03-22 ENCOUNTER — TELEPHONE (OUTPATIENT)
Dept: OBSTETRICS AND GYNECOLOGY | Facility: CLINIC | Age: 59
End: 2024-03-22
Payer: COMMERCIAL

## 2024-05-07 ENCOUNTER — APPOINTMENT (OUTPATIENT)
Dept: ENDOCRINOLOGY | Facility: CLINIC | Age: 59
End: 2024-05-07
Payer: COMMERCIAL

## 2024-06-18 ENCOUNTER — HOSPITAL ENCOUNTER (OUTPATIENT)
Dept: RADIOLOGY | Facility: HOSPITAL | Age: 59
Discharge: HOME | End: 2024-06-18
Payer: COMMERCIAL

## 2024-06-18 VITALS — WEIGHT: 136 LBS | BODY MASS INDEX: 19.47 KG/M2 | HEIGHT: 70 IN

## 2024-06-18 DIAGNOSIS — Z12.31 ENCOUNTER FOR SCREENING MAMMOGRAM FOR MALIGNANT NEOPLASM OF BREAST: ICD-10-CM

## 2024-06-18 PROCEDURE — 77067 SCR MAMMO BI INCL CAD: CPT | Performed by: RADIOLOGY

## 2024-06-18 PROCEDURE — 77063 BREAST TOMOSYNTHESIS BI: CPT | Performed by: RADIOLOGY

## 2024-06-18 PROCEDURE — 77067 SCR MAMMO BI INCL CAD: CPT

## 2024-06-19 ENCOUNTER — TELEPHONE (OUTPATIENT)
Dept: ENDOCRINOLOGY | Facility: CLINIC | Age: 59
End: 2024-06-19
Payer: COMMERCIAL

## 2024-06-20 ENCOUNTER — TELEPHONE (OUTPATIENT)
Dept: ENDOCRINOLOGY | Facility: CLINIC | Age: 59
End: 2024-06-20
Payer: COMMERCIAL

## 2024-06-20 DIAGNOSIS — C73 PAPILLARY CARCINOMA OF THYROID (MULTI): Primary | ICD-10-CM

## 2024-06-20 NOTE — TELEPHONE ENCOUNTER
Called and updated pt and pt expressed verbal understanding and appreciation     ----- Message from Edouard Valles MD sent at 6/20/2024  1:59 AM EDT -----  Regarding: RE: Lab req  Order on file  ----- Message -----  From: Josi Dallas MA  Sent: 6/19/2024   3:18 PM EDT  To: Edouard Valles MD  Subject: Lab req                                          Called requesting thyroid cancer labs ordered for fuv on 8/6/24    Thank you,   Josi

## 2024-06-28 ENCOUNTER — HOSPITAL ENCOUNTER (OUTPATIENT)
Dept: RADIOLOGY | Facility: CLINIC | Age: 59
End: 2024-06-28
Payer: COMMERCIAL

## 2024-07-19 ENCOUNTER — LAB REQUISITION (OUTPATIENT)
Dept: LAB | Facility: HOSPITAL | Age: 59
End: 2024-07-19
Payer: COMMERCIAL

## 2024-07-19 DIAGNOSIS — N39.0 URINARY TRACT INFECTION, SITE NOT SPECIFIED: ICD-10-CM

## 2024-07-19 PROCEDURE — 87086 URINE CULTURE/COLONY COUNT: CPT

## 2024-07-21 LAB — BACTERIA UR CULT: NORMAL

## 2024-07-31 ENCOUNTER — LAB (OUTPATIENT)
Dept: LAB | Facility: LAB | Age: 59
End: 2024-07-31
Payer: COMMERCIAL

## 2024-07-31 DIAGNOSIS — C73 PAPILLARY CARCINOMA OF THYROID (MULTI): ICD-10-CM

## 2024-07-31 PROCEDURE — 36415 COLL VENOUS BLD VENIPUNCTURE: CPT

## 2024-07-31 PROCEDURE — 86800 THYROGLOBULIN ANTIBODY: CPT

## 2024-07-31 PROCEDURE — 84432 ASSAY OF THYROGLOBULIN: CPT

## 2024-07-31 PROCEDURE — 84443 ASSAY THYROID STIM HORMONE: CPT

## 2024-08-01 LAB — TSH SERPL-ACNC: 3.33 MIU/L (ref 0.44–3.98)

## 2024-08-02 LAB
BILL ONLY-THYROGLOBULIN: NORMAL
THYROGLOB AB SERPL-ACNC: <0.9 IU/ML (ref 0–4)
THYROGLOB SERPL-MCNC: <0.1 NG/ML (ref 1.3–31.8)
THYROGLOB SERPL-MCNC: ABNORMAL NG/ML (ref 1.3–31.8)

## 2024-08-06 ENCOUNTER — APPOINTMENT (OUTPATIENT)
Dept: ENDOCRINOLOGY | Facility: CLINIC | Age: 59
End: 2024-08-06
Payer: COMMERCIAL

## 2024-08-06 VITALS
HEART RATE: 97 BPM | DIASTOLIC BLOOD PRESSURE: 71 MMHG | WEIGHT: 137 LBS | BODY MASS INDEX: 19.66 KG/M2 | SYSTOLIC BLOOD PRESSURE: 109 MMHG

## 2024-08-06 DIAGNOSIS — C73 PAPILLARY CARCINOMA OF THYROID (MULTI): Primary | ICD-10-CM

## 2024-08-06 PROCEDURE — 1036F TOBACCO NON-USER: CPT | Performed by: INTERNAL MEDICINE

## 2024-08-06 PROCEDURE — 99214 OFFICE O/P EST MOD 30 MIN: CPT | Performed by: INTERNAL MEDICINE

## 2024-08-06 ASSESSMENT — ENCOUNTER SYMPTOMS
FATIGUE: 0
ABDOMINAL PAIN: 0
NECK PAIN: 0
TROUBLE SWALLOWING: 0
CONSTIPATION: 0
VOMITING: 0
SHORTNESS OF BREATH: 0
TREMORS: 0
FEVER: 0
WEAKNESS: 0
NAUSEA: 0
DIARRHEA: 0
UNEXPECTED WEIGHT CHANGE: 0
NERVOUS/ANXIOUS: 0
ROS SKIN COMMENTS: DRY
PALPITATIONS: 0
HEADACHES: 0

## 2024-08-06 NOTE — PATIENT INSTRUCTIONS
RECOMMENDATIONS  Continue current Synthroid program.   Take levothyroxine on an empty stomach with water alone, 1 hour before eating or taking other medications, 4 hours before any calcium or iron supplement.    Repeat TSH with labs for Dr. Swanson in October.  Follow up 6 months  Repeat TSH before next appointment    Target TSH 0.4-2 mcg/day
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
## 2024-08-20 ENCOUNTER — HOSPITAL ENCOUNTER (OUTPATIENT)
Dept: RADIOLOGY | Facility: CLINIC | Age: 59
End: 2024-08-20
Payer: COMMERCIAL

## 2024-08-20 DIAGNOSIS — C73 PAPILLARY CARCINOMA OF THYROID (MULTI): ICD-10-CM

## 2024-08-21 RX ORDER — LEVOTHYROXINE SODIUM 75 UG/1
TABLET ORAL
Qty: 26 TABLET | Refills: 3 | Status: SHIPPED | OUTPATIENT
Start: 2024-08-21

## 2024-10-03 ENCOUNTER — HOSPITAL ENCOUNTER (OUTPATIENT)
Dept: RADIOLOGY | Facility: CLINIC | Age: 59
Discharge: HOME | End: 2024-10-03
Payer: COMMERCIAL

## 2024-10-03 DIAGNOSIS — R16.0 HEPATOMEGALY, NOT ELSEWHERE CLASSIFIED: ICD-10-CM

## 2024-10-03 PROCEDURE — 93975 VASCULAR STUDY: CPT | Mod: 59

## 2025-01-13 ENCOUNTER — APPOINTMENT (OUTPATIENT)
Dept: DERMATOLOGY | Facility: CLINIC | Age: 60
End: 2025-01-13
Payer: COMMERCIAL

## 2025-01-13 ENCOUNTER — OFFICE VISIT (OUTPATIENT)
Dept: OTOLARYNGOLOGY | Facility: CLINIC | Age: 60
End: 2025-01-13
Payer: COMMERCIAL

## 2025-01-13 VITALS — BODY MASS INDEX: 20.04 KG/M2 | WEIGHT: 140 LBS | HEIGHT: 70 IN

## 2025-01-13 DIAGNOSIS — C73 PAPILLARY CARCINOMA OF THYROID (MULTI): ICD-10-CM

## 2025-01-13 DIAGNOSIS — H66.91 OTITIS MEDIA, CHRONIC, RIGHT: Primary | ICD-10-CM

## 2025-01-13 DIAGNOSIS — H90.11 CONDUCTIVE HEARING LOSS OF RIGHT EAR WITH UNRESTRICTED HEARING OF LEFT EAR: ICD-10-CM

## 2025-01-13 PROCEDURE — 3008F BODY MASS INDEX DOCD: CPT | Performed by: OTOLARYNGOLOGY

## 2025-01-13 PROCEDURE — 99204 OFFICE O/P NEW MOD 45 MIN: CPT | Performed by: OTOLARYNGOLOGY

## 2025-01-13 PROCEDURE — 1036F TOBACCO NON-USER: CPT | Performed by: OTOLARYNGOLOGY
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
## 2025-02-05 ENCOUNTER — APPOINTMENT (OUTPATIENT)
Dept: ENDOCRINOLOGY | Facility: CLINIC | Age: 60
End: 2025-02-05
Payer: COMMERCIAL

## 2025-02-05 VITALS — BODY MASS INDEX: 20.09 KG/M2 | WEIGHT: 140 LBS

## 2025-02-05 DIAGNOSIS — C73 PAPILLARY CARCINOMA OF THYROID (MULTI): ICD-10-CM

## 2025-02-05 PROCEDURE — 99213 OFFICE O/P EST LOW 20 MIN: CPT | Performed by: INTERNAL MEDICINE

## 2025-02-05 RX ORDER — ZINC GLUCONATE 50 MG
1 TABLET ORAL DAILY
COMMUNITY

## 2025-02-05 RX ORDER — LEVOTHYROXINE SODIUM 88 UG/1
88 TABLET ORAL DAILY
Qty: 90 TABLET | Refills: 3 | Status: SHIPPED | OUTPATIENT
Start: 2025-02-05 | End: 2026-02-05

## 2025-02-05 RX ORDER — GINGER ROOT 550 MG
CAPSULE ORAL
COMMUNITY

## 2025-02-05 RX ORDER — LEVOTHYROXINE SODIUM 88 UG/1
88 TABLET ORAL DAILY
Qty: 64 TABLET | Refills: 3 | Status: SHIPPED | OUTPATIENT
Start: 2025-02-05 | End: 2025-02-05 | Stop reason: SDUPTHER

## 2025-02-05 ASSESSMENT — ENCOUNTER SYMPTOMS
DIARRHEA: 1
CONSTIPATION: 0
PALPITATIONS: 0
WEAKNESS: 0
HEADACHES: 0
UNEXPECTED WEIGHT CHANGE: 0
SHORTNESS OF BREATH: 0
FATIGUE: 1
NERVOUS/ANXIOUS: 0
VOMITING: 0
NECK PAIN: 1
NAUSEA: 0
FEVER: 0
ABDOMINAL PAIN: 0
TREMORS: 0
TROUBLE SWALLOWING: 0

## 2025-02-05 NOTE — PATIENT INSTRUCTIONS
RECOMMENDATIONS  Continue Synthroid 88 mcg/day  Take levothyroxine on an empty stomach with water alone, 1 hour before eating or taking other medications, 4 hours before any calcium or iron supplement.    Follow up July 2025  Draw TSH, thyroglobulin before next appointment
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
## 2025-03-03 ENCOUNTER — TELEPHONE (OUTPATIENT)
Dept: OBSTETRICS AND GYNECOLOGY | Facility: CLINIC | Age: 60
End: 2025-03-03
Payer: COMMERCIAL

## 2025-04-22 NOTE — PROGRESS NOTES
Subjective   Patient ID: Julia Barney is a 59 y.o. female who presents for Annual Exam.    Pap: 2020 NEG/NEG  Mammo: 8- dense breasts, discussed fast breast MRI.  Dexa: 2020, osteopenia, was prescribed Boniva but never took. No FH.  Colonoscopy: 20, 10 years.     Last seen 24.  Had breast pain last time. Still gets it sometimes on the right side. Doing dietary changes.      female presents for annual well woman exam. Takes ca and vit D 3. No VB. Kids all grown in 20s.  Menopausal symptoms include some vaginal dryness, hot flashes bad last month, not now. Sex drive always low. Uses cocconut oil. Gets cysts. Sees a dermatologist.   She denies any breast changes.   She is , currently sexually active. Denies dyspareunia.   Denies abnormal vaginal discharge, itching, or odor.  Denies pelvic pain.   No urinary concerns.   She does have occasional constipation. She had a colonoscopy in 2018 which was negative and is due to return in 10 years.   She is a non-smoker.   Medical hx. significant for anxiety, GERD, thyroidectomy for thyroid cancer, tension headaches.   Pap hx. normal.   Denies significant family history of ovarian cancer.  Family h/o colon cancer - MGF.   Family h/o breast cancer - mom 88, maternal aunt both post-menopause. ??mGM. mcousin now with breast ca. No one genetics. mGF stomach ca, Dad lung ca.   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      Review of Systems   Constitutional:  Positive for fatigue.   HENT:  Positive for hearing loss.    Gastrointestinal:  Positive for abdominal pain and diarrhea.   Genitourinary:  Positive for dyspareunia, frequency and urgency.        Hot flashes  Vaginal dryness, itching   Neurological:  Positive for dizziness.   Psychiatric/Behavioral:  Positive for sleep disturbance.         Anxiety, depression   All other systems reviewed and are negative.      Objective   Constitutional: Alert and in no acute distress. Well developed,  well nourished.  Cardiovascular: Heart rate and rhythm were normal, normal S1 and S2, no gallops, and no murmurs.  Pulmonary: No respiratory distress and clear bilateral breath sounds.  Neck: no neck asymmetry. Supple and thyroid not enlarged and there were no palpable thyroid nodules.  Chest: Breasts normal appearance, no nipple discharge and no skin changes and palpation of breasts and axillae: no palpable mass and no axillary lymphadenopathy.  Abdomen: soft nontender; no abdominal mass palpated, no organomegaly and no hernias.  Genitourinary: external genitalia: normal, no inguinal lymphadenopathy, Bartholin's urethral and Chaires's glands: normal, urethra: normal and bladder: normal on palpation.  Vagina: normal.  Cervix: normal.  Uterus: normal.   Right adnexa/parametria: normal.  Left adnexa/parametria: normal.  Skin: normal skin color and pigmentation, normal skin turgor and no rash.  Psychiatric: alert and oriented x 3, affect normal to patient baseline and mood appropriate.     Assessment/Plan   -pap-HPV  -navi-hiren  -DEXA  -Discussed decreased sex drive. She enjoys sex. Discomfort is an issue and needs to be addressed first. She is concerned that cost may be an issue with her insurance. Will try Estradiol cream.

## 2025-04-24 ENCOUNTER — APPOINTMENT (OUTPATIENT)
Dept: OBSTETRICS AND GYNECOLOGY | Facility: CLINIC | Age: 60
End: 2025-04-24
Payer: COMMERCIAL

## 2025-04-24 VITALS
BODY MASS INDEX: 19.76 KG/M2 | HEIGHT: 70 IN | WEIGHT: 138 LBS | SYSTOLIC BLOOD PRESSURE: 114 MMHG | DIASTOLIC BLOOD PRESSURE: 64 MMHG

## 2025-04-24 DIAGNOSIS — Z01.419 WELL WOMAN EXAM WITH ROUTINE GYNECOLOGICAL EXAM: ICD-10-CM

## 2025-04-24 DIAGNOSIS — Z12.4 CERVICAL CANCER SCREENING: ICD-10-CM

## 2025-04-24 DIAGNOSIS — Z12.31 ENCOUNTER FOR SCREENING MAMMOGRAM FOR BREAST CANCER: Primary | ICD-10-CM

## 2025-04-24 DIAGNOSIS — M85.80 OSTEOPENIA, UNSPECIFIED LOCATION: ICD-10-CM

## 2025-04-24 DIAGNOSIS — N89.8 VAGINAL DRYNESS: ICD-10-CM

## 2025-04-24 PROCEDURE — 87624 HPV HI-RISK TYP POOLED RSLT: CPT

## 2025-04-24 PROCEDURE — 1036F TOBACCO NON-USER: CPT | Performed by: OBSTETRICS & GYNECOLOGY

## 2025-04-24 PROCEDURE — 99396 PREV VISIT EST AGE 40-64: CPT | Performed by: OBSTETRICS & GYNECOLOGY

## 2025-04-24 PROCEDURE — 3008F BODY MASS INDEX DOCD: CPT | Performed by: OBSTETRICS & GYNECOLOGY

## 2025-04-24 RX ORDER — ESTRADIOL 0.1 MG/G
0.5 CREAM VAGINAL NIGHTLY
Qty: 34 G | Refills: 3 | Status: SHIPPED | OUTPATIENT
Start: 2025-04-24 | End: 2026-04-24

## 2025-04-24 RX ORDER — ESTRADIOL 0.1 MG/G
2 CREAM VAGINAL NIGHTLY
Qty: 34 G | Refills: 3 | Status: SHIPPED | OUTPATIENT
Start: 2025-04-24 | End: 2025-04-24

## 2025-04-24 ASSESSMENT — ENCOUNTER SYMPTOMS
FREQUENCY: 1
ABDOMINAL PAIN: 1
DIARRHEA: 1
DIZZINESS: 1
FATIGUE: 1
SLEEP DISTURBANCE: 1

## 2025-04-26 LAB — TSH SERPL-ACNC: 1.46 MIU/L (ref 0.4–4.5)

## 2025-05-05 DIAGNOSIS — C73 PAPILLARY CARCINOMA OF THYROID: ICD-10-CM

## 2025-05-07 ENCOUNTER — APPOINTMENT (OUTPATIENT)
Dept: ENDOCRINOLOGY | Facility: CLINIC | Age: 60
End: 2025-05-07
Payer: COMMERCIAL

## 2025-05-12 ENCOUNTER — APPOINTMENT (OUTPATIENT)
Dept: OTOLARYNGOLOGY | Facility: CLINIC | Age: 60
End: 2025-05-12
Payer: COMMERCIAL

## 2025-05-12 ENCOUNTER — APPOINTMENT (OUTPATIENT)
Dept: AUDIOLOGY | Facility: CLINIC | Age: 60
End: 2025-05-12
Payer: COMMERCIAL

## 2025-05-14 ENCOUNTER — APPOINTMENT (OUTPATIENT)
Dept: OTOLARYNGOLOGY | Facility: CLINIC | Age: 60
End: 2025-05-14
Payer: COMMERCIAL

## 2025-05-28 ENCOUNTER — TELEPHONE (OUTPATIENT)
Dept: ENDOCRINOLOGY | Facility: CLINIC | Age: 60
End: 2025-05-28
Payer: COMMERCIAL

## 2025-05-28 NOTE — TELEPHONE ENCOUNTER
Pt called to schedule fuv and was scheduled for 6/27/25. Pt is asking if a TSH can be ordered - TSH on file and pt informed.

## 2025-05-30 ENCOUNTER — APPOINTMENT (OUTPATIENT)
Dept: PRIMARY CARE | Facility: CLINIC | Age: 60
End: 2025-05-30
Payer: COMMERCIAL

## 2025-06-02 ENCOUNTER — APPOINTMENT (OUTPATIENT)
Dept: OTOLARYNGOLOGY | Facility: CLINIC | Age: 60
End: 2025-06-02
Payer: COMMERCIAL

## 2025-06-02 DIAGNOSIS — Z98.890 HISTORY OF EAR SURGERY: ICD-10-CM

## 2025-06-02 DIAGNOSIS — H61.20 WAX IN EAR: Primary | ICD-10-CM

## 2025-06-02 PROCEDURE — 1036F TOBACCO NON-USER: CPT | Performed by: OTOLARYNGOLOGY

## 2025-06-02 PROCEDURE — 99213 OFFICE O/P EST LOW 20 MIN: CPT | Performed by: OTOLARYNGOLOGY
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
## 2025-06-19 LAB — TSH SERPL-ACNC: 1.92 MIU/L (ref 0.4–4.5)

## 2025-06-27 ENCOUNTER — APPOINTMENT (OUTPATIENT)
Dept: ENDOCRINOLOGY | Facility: CLINIC | Age: 60
End: 2025-06-27
Payer: COMMERCIAL

## 2025-06-27 VITALS
HEART RATE: 68 BPM | DIASTOLIC BLOOD PRESSURE: 71 MMHG | WEIGHT: 141 LBS | SYSTOLIC BLOOD PRESSURE: 103 MMHG | BODY MASS INDEX: 20.23 KG/M2

## 2025-06-27 DIAGNOSIS — C73 PAPILLARY CARCINOMA OF THYROID: Primary | ICD-10-CM

## 2025-06-27 PROCEDURE — 1036F TOBACCO NON-USER: CPT | Performed by: INTERNAL MEDICINE

## 2025-06-27 PROCEDURE — 99213 OFFICE O/P EST LOW 20 MIN: CPT | Performed by: INTERNAL MEDICINE

## 2025-06-27 ASSESSMENT — ENCOUNTER SYMPTOMS
NAUSEA: 0
VOMITING: 0
SHORTNESS OF BREATH: 0
NECK PAIN: 0
DIARRHEA: 0
SORE THROAT: 1
COUGH: 1
FATIGUE: 0
TROUBLE SWALLOWING: 0
HEADACHES: 0
WEAKNESS: 0
FEVER: 0
NERVOUS/ANXIOUS: 0
TREMORS: 0
APNEA: 1
UNEXPECTED WEIGHT CHANGE: 0
CONSTIPATION: 0
PALPITATIONS: 1
ABDOMINAL PAIN: 0

## 2025-06-27 ASSESSMENT — COLUMBIA-SUICIDE SEVERITY RATING SCALE - C-SSRS
1. IN THE PAST MONTH, HAVE YOU WISHED YOU WERE DEAD OR WISHED YOU COULD GO TO SLEEP AND NOT WAKE UP?: NO
6. HAVE YOU EVER DONE ANYTHING, STARTED TO DO ANYTHING, OR PREPARED TO DO ANYTHING TO END YOUR LIFE?: NO
2. HAVE YOU ACTUALLY HAD ANY THOUGHTS OF KILLING YOURSELF?: NO

## 2025-06-27 ASSESSMENT — PATIENT HEALTH QUESTIONNAIRE - PHQ9
1. LITTLE INTEREST OR PLEASURE IN DOING THINGS: NOT AT ALL
SUM OF ALL RESPONSES TO PHQ9 QUESTIONS 1 AND 2: 0
2. FEELING DOWN, DEPRESSED OR HOPELESS: NOT AT ALL
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
## 2025-06-27 NOTE — PATIENT INSTRUCTIONS
RECOMMENDATIONS  Continue Synthroid 88 mcg/day  Take levothyroxine on an empty stomach with water alone, 1 hour before eating or taking other medications, 4 hours before any calcium or iron supplement.    Follow up 4 months  Draw TSH, thyroglobulin before next appointment

## 2025-08-19 ENCOUNTER — APPOINTMENT (OUTPATIENT)
Dept: OBSTETRICS AND GYNECOLOGY | Facility: CLINIC | Age: 60
End: 2025-08-19
Payer: COMMERCIAL

## 2025-08-19 VITALS — BODY MASS INDEX: 19.94 KG/M2 | DIASTOLIC BLOOD PRESSURE: 76 MMHG | WEIGHT: 139 LBS | SYSTOLIC BLOOD PRESSURE: 114 MMHG

## 2025-08-19 DIAGNOSIS — N95.0 POSTMENOPAUSAL BLEEDING: Primary | ICD-10-CM

## 2025-08-19 PROCEDURE — 99213 OFFICE O/P EST LOW 20 MIN: CPT | Performed by: OBSTETRICS & GYNECOLOGY

## 2025-08-26 ENCOUNTER — APPOINTMENT (OUTPATIENT)
Dept: RADIOLOGY | Facility: HOSPITAL | Age: 60
End: 2025-08-26
Payer: COMMERCIAL

## 2025-08-26 VITALS — HEIGHT: 70 IN | BODY MASS INDEX: 20.04 KG/M2 | WEIGHT: 140 LBS

## 2025-08-26 DIAGNOSIS — Z12.31 ENCOUNTER FOR SCREENING MAMMOGRAM FOR BREAST CANCER: ICD-10-CM

## 2025-08-26 PROCEDURE — 77063 BREAST TOMOSYNTHESIS BI: CPT | Performed by: RADIOLOGY

## 2025-08-26 PROCEDURE — 77067 SCR MAMMO BI INCL CAD: CPT | Performed by: RADIOLOGY

## 2025-08-26 PROCEDURE — 77067 SCR MAMMO BI INCL CAD: CPT

## 2025-08-29 ENCOUNTER — HOSPITAL ENCOUNTER (OUTPATIENT)
Dept: RADIOLOGY | Facility: HOSPITAL | Age: 60
Discharge: HOME | End: 2025-08-29
Payer: COMMERCIAL

## 2025-08-29 DIAGNOSIS — M85.80 OSTEOPENIA, UNSPECIFIED LOCATION: ICD-10-CM

## 2025-08-29 PROCEDURE — 77080 DXA BONE DENSITY AXIAL: CPT

## 2025-09-02 ENCOUNTER — HOSPITAL ENCOUNTER (OUTPATIENT)
Dept: RADIOLOGY | Facility: CLINIC | Age: 60
Discharge: HOME | End: 2025-09-02
Payer: COMMERCIAL

## 2025-09-02 DIAGNOSIS — N95.0 POSTMENOPAUSAL BLEEDING: ICD-10-CM

## 2025-09-02 PROCEDURE — 76856 US EXAM PELVIC COMPLETE: CPT

## 2025-10-14 ENCOUNTER — APPOINTMENT (OUTPATIENT)
Dept: ENDOCRINOLOGY | Facility: CLINIC | Age: 60
End: 2025-10-14
Payer: COMMERCIAL

## 2025-11-03 ENCOUNTER — APPOINTMENT (OUTPATIENT)
Dept: OTOLARYNGOLOGY | Facility: CLINIC | Age: 60
End: 2025-11-03
Payer: COMMERCIAL

## (undated) DEVICE — NEEDLE, SAFETY, 21 G X 1.5 IN

## (undated) DEVICE — SOLUTION, INJECTION, USP, SODIUM CHLORIDE 0.9%, .9, NACL, 1000 ML, BAG

## (undated) DEVICE — SUTURE, VICRYL, 0, 18 IN, TIE, VIOLET

## (undated) DEVICE — DRAPE, INSTRUMENT, W/POUCH, STERI DRAPE, 9 5/8 X 18 LONG

## (undated) DEVICE — ELECTRODE, ELECTROSURGICAL, BLADE, INSULATED, ENT/IMA, STERILE

## (undated) DEVICE — RETRIEVAL SYSTEM, MONARCH INZII, 5MM

## (undated) DEVICE — DRAPE PACK, LAPAROSCOPIC CHOLECYSTECTOMY, CUSTOM, GEAUGA

## (undated) DEVICE — SUTURE, VICRYL, 4-0, 18 IN, PS2, UNDYED

## (undated) DEVICE — GOWN, ASTOUND, L

## (undated) DEVICE — TROCAR, KII OPTICAL SEPARATOR, 8MM X 100MM,  Z-THREAD

## (undated) DEVICE — CLIP, ENDO APPLIER LIGAMAX 5MM

## (undated) DEVICE — SYRINGE, LUER LOCK, 12ML

## (undated) DEVICE — NEEDLE, INSUFFLATION, 13GAX120MM, DISP

## (undated) DEVICE — TISSUE ADHESIVE, PREMIERPRO EXOFIN, PRECISION PEN HV, 1.0ML

## (undated) DEVICE — SOLUTION, IRRIGATION, SODIUM CHLORIDE 0.9%, 1000 ML, POUR BOTTLE

## (undated) DEVICE — ACCESS SYS, KII SHIELDED BLADED, Z-THREAD, 5X100CM

## (undated) DEVICE — APPLICATOR, CHLORAPREP, W/ORANGE TINT, 26ML

## (undated) DEVICE — Device

## (undated) DEVICE — CABLE, ELECTROSURGICAL, MONOPOLAR, LAPAROSCOPIC, 10 FT, LF

## (undated) DEVICE — TROCAR, KII OPTICAL BLADELESS 5MM Z THREAD 100MM LNGTH